# Patient Record
Sex: MALE | Employment: OTHER
[De-identification: names, ages, dates, MRNs, and addresses within clinical notes are randomized per-mention and may not be internally consistent; named-entity substitution may affect disease eponyms.]

---

## 2024-07-11 ENCOUNTER — HOSPITAL ENCOUNTER (EMERGENCY)
Facility: HOSPITAL | Age: 69
Discharge: HOME | End: 2024-07-14
Attending: EMERGENCY MEDICINE
Payer: MEDICARE

## 2024-07-11 DIAGNOSIS — R45.851 SUICIDAL IDEATION: Primary | ICD-10-CM

## 2024-07-11 DIAGNOSIS — F11.93 OPIOID WITHDRAWAL (HCC): ICD-10-CM

## 2024-07-11 LAB
AMPHET UR QL SCN: NEGATIVE
ANION GAP SERPL CALC-SCNC: 6 MMOL/L (ref 3–18)
BARBITURATES UR QL SCN: NEGATIVE
BASOPHILS # BLD: 0.1 K/UL (ref 0–0.1)
BASOPHILS NFR BLD: 0 % (ref 0–2)
BENZODIAZ UR QL: NEGATIVE
BUN SERPL-MCNC: 5 MG/DL (ref 7–18)
BUN/CREAT SERPL: 9 (ref 12–20)
CALCIUM SERPL-MCNC: 8.7 MG/DL (ref 8.5–10.1)
CANNABINOIDS UR QL SCN: NEGATIVE
CHLORIDE SERPL-SCNC: 104 MMOL/L (ref 100–111)
CO2 SERPL-SCNC: 26 MMOL/L (ref 21–32)
COCAINE UR QL SCN: POSITIVE
CREAT SERPL-MCNC: 0.53 MG/DL (ref 0.6–1.3)
DIFFERENTIAL METHOD BLD: ABNORMAL
EOSINOPHIL # BLD: 0 K/UL (ref 0–0.4)
EOSINOPHIL NFR BLD: 0 % (ref 0–5)
ERYTHROCYTE [DISTWIDTH] IN BLOOD BY AUTOMATED COUNT: 15.2 % (ref 11.6–14.5)
ETHANOL SERPL-MCNC: <3 MG/DL (ref 0–3)
GLUCOSE SERPL-MCNC: 96 MG/DL (ref 74–99)
HCT VFR BLD AUTO: 49.1 % (ref 36–48)
HGB BLD-MCNC: 16.4 G/DL (ref 13–16)
IMM GRANULOCYTES # BLD AUTO: 0 K/UL (ref 0–0.04)
IMM GRANULOCYTES NFR BLD AUTO: 0 % (ref 0–0.5)
LYMPHOCYTES # BLD: 1.2 K/UL (ref 0.9–3.6)
LYMPHOCYTES NFR BLD: 10 % (ref 21–52)
Lab: ABNORMAL
MCH RBC QN AUTO: 29.4 PG (ref 24–34)
MCHC RBC AUTO-ENTMCNC: 33.4 G/DL (ref 31–37)
MCV RBC AUTO: 88 FL (ref 78–100)
METHADONE UR QL: NEGATIVE
MONOCYTES # BLD: 0.5 K/UL (ref 0.05–1.2)
MONOCYTES NFR BLD: 4 % (ref 3–10)
NEUTS SEG # BLD: 10 K/UL (ref 1.8–8)
NEUTS SEG NFR BLD: 85 % (ref 40–73)
NRBC # BLD: 0 K/UL (ref 0–0.01)
NRBC BLD-RTO: 0 PER 100 WBC
OPIATES UR QL: POSITIVE
PCP UR QL: NEGATIVE
PLATELET # BLD AUTO: 228 K/UL (ref 135–420)
PMV BLD AUTO: 9.2 FL (ref 9.2–11.8)
POTASSIUM SERPL-SCNC: 5.7 MMOL/L (ref 3.5–5.5)
RBC # BLD AUTO: 5.58 M/UL (ref 4.35–5.65)
SODIUM SERPL-SCNC: 136 MMOL/L (ref 136–145)
WBC # BLD AUTO: 11.7 K/UL (ref 4.6–13.2)

## 2024-07-11 PROCEDURE — 82077 ASSAY SPEC XCP UR&BREATH IA: CPT

## 2024-07-11 PROCEDURE — 80307 DRUG TEST PRSMV CHEM ANLYZR: CPT

## 2024-07-11 PROCEDURE — 85025 COMPLETE CBC W/AUTO DIFF WBC: CPT

## 2024-07-11 PROCEDURE — 99285 EMERGENCY DEPT VISIT HI MDM: CPT

## 2024-07-11 PROCEDURE — 90792 PSYCH DIAG EVAL W/MED SRVCS: CPT | Performed by: NURSE PRACTITIONER

## 2024-07-11 PROCEDURE — 80048 BASIC METABOLIC PNL TOTAL CA: CPT

## 2024-07-11 PROCEDURE — 6360000002 HC RX W HCPCS: Performed by: PHYSICIAN ASSISTANT

## 2024-07-11 RX ORDER — BUPRENORPHINE HYDROCHLORIDE AND NALOXONE HYDROCHLORIDE DIHYDRATE 2; .5 MG/1; MG/1
2 TABLET SUBLINGUAL PRN
Status: DISCONTINUED | OUTPATIENT
Start: 2024-07-11 | End: 2024-07-14 | Stop reason: HOSPADM

## 2024-07-11 RX ADMIN — BUPRENORPHINE HYDROCHLORIDE AND NALOXONE HYDROCHLORIDE DIHYDRATE 2 TABLET: 2; .5 TABLET SUBLINGUAL at 21:12

## 2024-07-11 NOTE — ED NOTES
Contact information of family:    Mario (daughter) [phone]  Godfrey (nephew) [phone]  Ronaldo (brother) [phone]

## 2024-07-11 NOTE — ED TRIAGE NOTES
Patient arrived via [facility].  Patient was found wandering.  States he was in detox this am and left because it was too cold. Patient states he now feels suicidal, will hang himself as he has tried this in the past and feels this will be \"an easy way to go\".  Patient changed into paper scrubs and red non slip socks. Urine sample obtained and sent to the lab.

## 2024-07-11 NOTE — ED NOTES
Spoke with telepsych provider.  Advised provider of SI statement from patient with a plan to \"hang myself\". Provided contact number of family.

## 2024-07-12 LAB
ANION GAP SERPL CALC-SCNC: 9 MMOL/L (ref 3–18)
BUN SERPL-MCNC: 5 MG/DL (ref 7–18)
BUN/CREAT SERPL: 10 (ref 12–20)
CALCIUM SERPL-MCNC: 7.4 MG/DL (ref 8.5–10.1)
CHLORIDE SERPL-SCNC: 103 MMOL/L (ref 100–111)
CO2 SERPL-SCNC: 25 MMOL/L (ref 21–32)
CREAT SERPL-MCNC: 0.51 MG/DL (ref 0.6–1.3)
GLUCOSE SERPL-MCNC: 114 MG/DL (ref 74–99)
POTASSIUM SERPL-SCNC: 2.7 MMOL/L (ref 3.5–5.5)
SODIUM SERPL-SCNC: 137 MMOL/L (ref 136–145)

## 2024-07-12 PROCEDURE — 80048 BASIC METABOLIC PNL TOTAL CA: CPT

## 2024-07-12 PROCEDURE — 6360000002 HC RX W HCPCS: Performed by: STUDENT IN AN ORGANIZED HEALTH CARE EDUCATION/TRAINING PROGRAM

## 2024-07-12 PROCEDURE — 96361 HYDRATE IV INFUSION ADD-ON: CPT

## 2024-07-12 PROCEDURE — 96374 THER/PROPH/DIAG INJ IV PUSH: CPT

## 2024-07-12 PROCEDURE — 6370000000 HC RX 637 (ALT 250 FOR IP): Performed by: EMERGENCY MEDICINE

## 2024-07-12 PROCEDURE — 6370000000 HC RX 637 (ALT 250 FOR IP): Performed by: HEALTH CARE PROVIDER

## 2024-07-12 PROCEDURE — 6360000002 HC RX W HCPCS: Performed by: PHYSICIAN ASSISTANT

## 2024-07-12 PROCEDURE — 6370000000 HC RX 637 (ALT 250 FOR IP): Performed by: STUDENT IN AN ORGANIZED HEALTH CARE EDUCATION/TRAINING PROGRAM

## 2024-07-12 PROCEDURE — 6360000002 HC RX W HCPCS: Performed by: HEALTH CARE PROVIDER

## 2024-07-12 PROCEDURE — 2580000003 HC RX 258: Performed by: HEALTH CARE PROVIDER

## 2024-07-12 PROCEDURE — 96376 TX/PRO/DX INJ SAME DRUG ADON: CPT

## 2024-07-12 RX ORDER — SODIUM CHLORIDE, SODIUM LACTATE, POTASSIUM CHLORIDE, AND CALCIUM CHLORIDE .6; .31; .03; .02 G/100ML; G/100ML; G/100ML; G/100ML
2000 INJECTION, SOLUTION INTRAVENOUS ONCE
Status: COMPLETED | OUTPATIENT
Start: 2024-07-12 | End: 2024-07-12

## 2024-07-12 RX ORDER — LOSARTAN POTASSIUM 25 MG/1
25 TABLET ORAL DAILY
Status: DISCONTINUED | OUTPATIENT
Start: 2024-07-12 | End: 2024-07-14 | Stop reason: HOSPADM

## 2024-07-12 RX ORDER — POTASSIUM CHLORIDE 20 MEQ/1
40 TABLET, EXTENDED RELEASE ORAL
Status: COMPLETED | OUTPATIENT
Start: 2024-07-12 | End: 2024-07-12

## 2024-07-12 RX ORDER — POTASSIUM CHLORIDE 20 MEQ/1
40 TABLET, EXTENDED RELEASE ORAL
Status: DISCONTINUED | OUTPATIENT
Start: 2024-07-12 | End: 2024-07-12

## 2024-07-12 RX ORDER — ONDANSETRON 4 MG/1
4 TABLET, ORALLY DISINTEGRATING ORAL
Status: COMPLETED | OUTPATIENT
Start: 2024-07-12 | End: 2024-07-12

## 2024-07-12 RX ORDER — ONDANSETRON 2 MG/ML
4 INJECTION INTRAMUSCULAR; INTRAVENOUS ONCE
Status: COMPLETED | OUTPATIENT
Start: 2024-07-12 | End: 2024-07-12

## 2024-07-12 RX ORDER — DIPHENHYDRAMINE HCL 25 MG
50 CAPSULE ORAL
Status: COMPLETED | OUTPATIENT
Start: 2024-07-12 | End: 2024-07-12

## 2024-07-12 RX ORDER — SODIUM CHLORIDE, SODIUM LACTATE, POTASSIUM CHLORIDE, AND CALCIUM CHLORIDE .6; .31; .03; .02 G/100ML; G/100ML; G/100ML; G/100ML
1000 INJECTION, SOLUTION INTRAVENOUS ONCE
Status: DISCONTINUED | OUTPATIENT
Start: 2024-07-12 | End: 2024-07-12

## 2024-07-12 RX ORDER — LANOLIN ALCOHOL/MO/W.PET/CERES
400 CREAM (GRAM) TOPICAL ONCE
Status: DISCONTINUED | OUTPATIENT
Start: 2024-07-12 | End: 2024-07-12

## 2024-07-12 RX ORDER — LANOLIN ALCOHOL/MO/W.PET/CERES
400 CREAM (GRAM) TOPICAL DAILY
Status: DISCONTINUED | OUTPATIENT
Start: 2024-07-12 | End: 2024-07-14 | Stop reason: HOSPADM

## 2024-07-12 RX ORDER — ONDANSETRON 2 MG/ML
4 INJECTION INTRAMUSCULAR; INTRAVENOUS EVERY 6 HOURS PRN
Status: DISCONTINUED | OUTPATIENT
Start: 2024-07-12 | End: 2024-07-14 | Stop reason: HOSPADM

## 2024-07-12 RX ADMIN — LOSARTAN POTASSIUM 25 MG: 25 TABLET, FILM COATED ORAL at 15:10

## 2024-07-12 RX ADMIN — SODIUM CHLORIDE, POTASSIUM CHLORIDE, SODIUM LACTATE AND CALCIUM CHLORIDE 2000 ML: 600; 310; 30; 20 INJECTION, SOLUTION INTRAVENOUS at 05:38

## 2024-07-12 RX ADMIN — POTASSIUM CHLORIDE 40 MEQ: 1500 TABLET, EXTENDED RELEASE ORAL at 17:44

## 2024-07-12 RX ADMIN — ONDANSETRON 4 MG: 2 INJECTION INTRAMUSCULAR; INTRAVENOUS at 15:37

## 2024-07-12 RX ADMIN — ONDANSETRON 4 MG: 2 INJECTION INTRAMUSCULAR; INTRAVENOUS at 22:08

## 2024-07-12 RX ADMIN — BUPRENORPHINE HYDROCHLORIDE AND NALOXONE HYDROCHLORIDE DIHYDRATE 2 TABLET: 2; .5 TABLET SUBLINGUAL at 01:20

## 2024-07-12 RX ADMIN — ONDANSETRON 4 MG: 2 INJECTION INTRAMUSCULAR; INTRAVENOUS at 15:36

## 2024-07-12 RX ADMIN — ONDANSETRON 4 MG: 4 TABLET, ORALLY DISINTEGRATING ORAL at 00:35

## 2024-07-12 RX ADMIN — DIPHENHYDRAMINE HYDROCHLORIDE 50 MG: 25 CAPSULE ORAL at 22:30

## 2024-07-12 RX ADMIN — BUPRENORPHINE HYDROCHLORIDE AND NALOXONE HYDROCHLORIDE DIHYDRATE 2 TABLET: 2; .5 TABLET SUBLINGUAL at 09:51

## 2024-07-12 RX ADMIN — Medication 400 MG: at 17:45

## 2024-07-12 RX ADMIN — BUPRENORPHINE HYDROCHLORIDE AND NALOXONE HYDROCHLORIDE DIHYDRATE 2 TABLET: 2; .5 TABLET SUBLINGUAL at 15:36

## 2024-07-12 RX ADMIN — ONDANSETRON 4 MG: 2 INJECTION INTRAMUSCULAR; INTRAVENOUS at 08:13

## 2024-07-12 ASSESSMENT — PAIN - FUNCTIONAL ASSESSMENT: PAIN_FUNCTIONAL_ASSESSMENT: NONE - DENIES PAIN

## 2024-07-12 NOTE — BSMART NOTE
Chief Complaint   Patient presents with    Suicidal       Patient was evaluated by Tele-Psych who recommends inpatient psychiatric treatment for suicidal ideations with a plan to hang himself with a belt.    Patient is voluntary for inpatient treatment.    No past medical history on file.    Prior to Visit Medications    Not on File         The following labs and vitals were reviewed with on-call psychiatrist.    BMP, CBC, ETOH, and UDS    Vitals:    07/12/24 0035   BP: (!) 165/105   Pulse: 66   Resp: 22   Temp:    SpO2: 98%         Writer met with patient to assess the following    Ambulation - Patient reports ability to ambulate without difficulty or the use of any assistive devices. and Patient reports a history of falls. Last fall \"last week.\"    ADLs - Patient able to perform own ADLs without assistance.    DME - Patient requires none.    In consultation with on call [facility] provider JAMES Zimmerman NP  patient has been  recommended for a bed search due to patient declining admission to [facility].  Patient requesting to go to facility that administers Suboxone. Patient will be referred to [facility] to initiate a bed search.

## 2024-07-12 NOTE — BSMART NOTE
Crisis Note: Bed search in progress:    Pending:  [facility]  [facility]     Capacity:  BS RISA  [facility]     No Jaycee:  Crisis Stabilizatio

## 2024-07-12 NOTE — ED NOTES
Bedside and Verbal shift change report given to Whitley(oncoming nurse) by Ana Maria (offgoing nurse). Report included the following information Nurse Handoff Report, ED SBAR, Intake/Output, and Recent Results.

## 2024-07-12 NOTE — BSMART NOTE
Crisis note:    Fernanda ghotra [facility] called to inform patient is being denied at facilities due to his elevated blood pressure, high potassium level, and frequent vomiting.    Called Dr. Hartman in the ER to discuss the above and he will address these concerns.

## 2024-07-12 NOTE — ED NOTES
I received the patient in turnover from HARVEY Suarez at the end of his shift.  The patient is a 69-year-old male who presented to the ED with suicidal ideation and opioid withdrawal.  He is on day 1 of outpatient Suboxone therapy.  He has been evaluated by telepsych who recommends admission.  Crisis is doing a bed search at this time.     Viviana Washington MD  07/15/24 6269

## 2024-07-12 NOTE — BSMART NOTE
Crisis note:    Called conduit and spoke to Winter, questioned if referral was sent to [facility]. Winter did not see where the referral was sent to [facility] but she will send now.

## 2024-07-13 PROCEDURE — 96376 TX/PRO/DX INJ SAME DRUG ADON: CPT

## 2024-07-13 PROCEDURE — 90832 PSYTX W PT 30 MINUTES: CPT | Performed by: SOCIAL WORKER

## 2024-07-13 PROCEDURE — 6360000002 HC RX W HCPCS: Performed by: NURSE PRACTITIONER

## 2024-07-13 PROCEDURE — 6360000002 HC RX W HCPCS: Performed by: PHYSICIAN ASSISTANT

## 2024-07-13 PROCEDURE — 6370000000 HC RX 637 (ALT 250 FOR IP): Performed by: STUDENT IN AN ORGANIZED HEALTH CARE EDUCATION/TRAINING PROGRAM

## 2024-07-13 RX ORDER — ONDANSETRON 2 MG/ML
4 INJECTION INTRAMUSCULAR; INTRAVENOUS ONCE
Status: COMPLETED | OUTPATIENT
Start: 2024-07-13 | End: 2024-07-13

## 2024-07-13 RX ADMIN — LOSARTAN POTASSIUM 25 MG: 25 TABLET, FILM COATED ORAL at 08:47

## 2024-07-13 RX ADMIN — BUPRENORPHINE HYDROCHLORIDE AND NALOXONE HYDROCHLORIDE DIHYDRATE 2 TABLET: 2; .5 TABLET SUBLINGUAL at 08:54

## 2024-07-13 RX ADMIN — ONDANSETRON 4 MG: 2 INJECTION INTRAMUSCULAR; INTRAVENOUS at 08:47

## 2024-07-13 RX ADMIN — Medication 400 MG: at 08:47

## 2024-07-13 RX ADMIN — BUPRENORPHINE HYDROCHLORIDE AND NALOXONE HYDROCHLORIDE DIHYDRATE 2 TABLET: 2; .5 TABLET SUBLINGUAL at 12:38

## 2024-07-13 NOTE — ED NOTES
Patient c/o trouble sleeping, MD notified, orders for benadryl placed. Patient took med in apple sauce

## 2024-07-13 NOTE — ED NOTES
Bedside and Verbal shift change report given to Natalie (oncoming nurse) by Whitley (offgoing nurse). Report included the following information Nurse Handoff Report, ED SBAR, and MAR.

## 2024-07-13 NOTE — BSMART NOTE
Crisis Note:   1340   Call to [facility]   [No callback number listed]   Entered By: Yesy Will, RN   Pending:  [facility]  [facility]     Capacity (overnight)  [facility]  [facility]  [facility]     Declined:   BS TC would review again is medically stable   [facility]  [facility] psych- if BP under contro will review again  [facility]- [facility]   [facility]

## 2024-07-13 NOTE — BSMART NOTE
CRISIS NOTE: Spoke with Annalise Chen for an update on bed search.  He advised the bed search has been exhausted for tonight and they will re-start in the morning.           Lizzie Chandler, CAROLE, CSAC, CADC  BSMART Counselor

## 2024-07-14 VITALS
DIASTOLIC BLOOD PRESSURE: 77 MMHG | OXYGEN SATURATION: 98 % | WEIGHT: 116 LBS | HEART RATE: 91 BPM | HEIGHT: 67 IN | SYSTOLIC BLOOD PRESSURE: 114 MMHG | RESPIRATION RATE: 20 BRPM | TEMPERATURE: 98.2 F | BODY MASS INDEX: 18.21 KG/M2

## 2024-07-14 PROCEDURE — 6360000002 HC RX W HCPCS: Performed by: HEALTH CARE PROVIDER

## 2024-07-14 PROCEDURE — 96376 TX/PRO/DX INJ SAME DRUG ADON: CPT

## 2024-07-14 PROCEDURE — 90832 PSYTX W PT 30 MINUTES: CPT | Performed by: SOCIAL WORKER

## 2024-07-14 PROCEDURE — 6370000000 HC RX 637 (ALT 250 FOR IP): Performed by: STUDENT IN AN ORGANIZED HEALTH CARE EDUCATION/TRAINING PROGRAM

## 2024-07-14 PROCEDURE — 6360000002 HC RX W HCPCS: Performed by: PHYSICIAN ASSISTANT

## 2024-07-14 RX ORDER — POTASSIUM CHLORIDE 20 MEQ/1
40 TABLET, EXTENDED RELEASE ORAL DAILY
Qty: 20 TABLET | Refills: 0 | Status: SHIPPED | OUTPATIENT
Start: 2024-07-14 | End: 2024-07-24

## 2024-07-14 RX ADMIN — BUPRENORPHINE HYDROCHLORIDE AND NALOXONE HYDROCHLORIDE DIHYDRATE 2 TABLET: 2; .5 TABLET SUBLINGUAL at 13:10

## 2024-07-14 RX ADMIN — Medication 400 MG: at 09:14

## 2024-07-14 RX ADMIN — ONDANSETRON 4 MG: 2 INJECTION INTRAMUSCULAR; INTRAVENOUS at 09:14

## 2024-07-14 RX ADMIN — LOSARTAN POTASSIUM 25 MG: 25 TABLET, FILM COATED ORAL at 09:14

## 2024-07-14 ASSESSMENT — PAIN SCALES - GENERAL: PAINLEVEL_OUTOF10: 0

## 2024-07-14 NOTE — ED NOTES
Assumed care of patient. Patient laying on left side. Quiet, calm. Eyes closed, wearing glasses, wearing blue scrubs, no socks. No respiratory distress observed.

## 2024-07-14 NOTE — ED NOTES
Bedside shift change report given to RN April by RN Ed. Report included the following information Nurse Handoff Report, MAR, and Recent Results.

## 2024-07-14 NOTE — ED NOTES
Assumed care of this patient. Patient is conscious and coherent, not in cardiopulmonary distress.   Patient denies SI/HI; denies Auditory/Visual hallucinations as well.

## 2024-07-14 NOTE — BSMART NOTE
Crisis Note:    0612   Call to [facility]   [No callback number listed]   Entered By: Tripp De La Cruz RN   Pending:  [facility]  [facility]     Capacity   [facility]     Declined:   [facility] TC - medical acuity  [facility]  VB psych- medical acuity  [facility]- [facility]   [facility]  [facility]

## 2024-07-14 NOTE — ED PROVIDER NOTES
12:59 PM :Pt care assumed from Dr. Calderon , ED provider. Pt complaint(s), current treatment plan, progression and available diagnostic results have been discussed thoroughly. The patient was seen and evaluated on my shift.   Rounding occurred: Yes  Intended Disposition: TBD  Pending diagnostic reports and/or labs (please list): Placement    Patient was reevaluated this morning by telepsychiatry and the patient is nonsuicidal and based on their reevaluation patient is cleared for discharge.  The patient was given potassium during his stay and will discharge him home with potassium to continue his replacement as an outpatient.  Patient will return if at all worsened or concerned.    Workup and recommendations were reviewed with the patient and all questions were answered.  The patient understands the plan and will proceed with close outpatient care.  I have encouraged the patient to return if at all worsened or concerned.   Phan Deshpande, DO 1:00 PM          Phan Deshpande MD  07/20/24 7756    
2327   Pt care received from Izabela Vallecillo PA-C, ED provider. Pt complaint(s), current treatment plan, progression and available diagnostic results have been discussed thoroughly. The patient was seen and evaluated on my shift, but please refer to their note for history and physical exam. Patient was informed of changing providers.   Rounding occurred:   Intended Disposition: inpatient psychiatry  Pending diagnostic reports and/or labs (please list): bed search    Chief complaint  Suicidal      ED Course as of 07/12/24 0501   Thu Jul 11, 2024   1653 Spoke with telepsych who recommends inpatient behavioral health admission.  [ET]   2327 Bed search in progress  Patient has SI with plan to hang himself today, doing outpatient heroin detox treated with Suboxone here prn.  Patient is medically cleared for inpatient substance abuse rehab. [AS]   2358 Patient vomiting in emesis bag [AS]   Fri Jul 12, 2024   0238 Patient resting in bed, not asleep but eyes closed and shifting from time to time. Last suboxone @ 0120 [AS]   0400 Patient awakens and has episode of vomiting, ordered 4 mg Zofran as needed for vomiting, 2 L lactated Ringer IV fluid bolus. [AS]      ED Course User Index  [AS] Adolph Suarez PA-C  [ET] Izabela Vallecillo PA       Diagnosis  1. Suicidal ideation    2. Opioid withdrawal (HCC)      0500  Pt care transferred to Dr. Washington ED provider. Pt complaint(s), current treatment plan, progression and available diagnostic results have been discussed thoroughly. The patient was seen and evaluated on my shift, but please refer to their note for assessment and disposition. Patient was informed of changing providers.   Rounding occurred: at provider station  Intended Disposition: Inpatient psych  Pending diagnostic reports and/or labs (please list): COWS assessments, bed search in progress      DISPOSITION        Orders Placed This Encounter   Medications    buprenorphine-naloxone (SUBOXONE) 2-0.5 MG SL tablet 2 
  Cardiovascular:  Negative for chest pain.   Gastrointestinal:  Negative for abdominal pain, diarrhea and vomiting.   Genitourinary:  Negative for dysuria and penile discharge.   Musculoskeletal:  Negative for arthralgias and myalgias.   Skin:  Negative for color change and wound.   Neurological:  Negative for dizziness and light-headedness.   Psychiatric/Behavioral:  Positive for suicidal ideas.    All other systems reviewed and are negative.      Physical Exam     Vitals:    07/12/24 0845 07/12/24 1115 07/12/24 1443 07/12/24 1719   BP: (!) 160/80 (!) 178/80 (!) 170/104 (!) 170/99   Pulse: 62 72 70 84   Resp: 20 18 20 17   Temp: 98 °F (36.7 °C) 98 °F (36.7 °C) 98.2 °F (36.8 °C) 98.2 °F (36.8 °C)   TempSrc: Oral Oral Oral Oral   SpO2: 99% 99% 99% 99%   Weight:       Height:           Physical Exam  Vitals and nursing note reviewed.   Constitutional:       Appearance: Normal appearance.      Comments: Pt in NAD   HENT:      Head: Normocephalic and atraumatic.   Eyes:      Conjunctiva/sclera: Conjunctivae normal.   Cardiovascular:      Rate and Rhythm: Normal rate and regular rhythm.   Pulmonary:      Effort: Pulmonary effort is normal.      Breath sounds: Normal breath sounds.   Abdominal:      General: Bowel sounds are normal.      Palpations: Abdomen is soft.   Musculoskeletal:      Cervical back: Full passive range of motion without pain.   Skin:     General: Skin is warm and moist.   Neurological:      Mental Status: He is alert.   Psychiatric:         Behavior: Behavior is withdrawn.         Thought Content: Thought content includes suicidal ideation. Thought content does not include homicidal ideation. Thought content includes suicidal plan.         Diagnostic Study Results     Labs -     Recent Results (from the past 12 hour(s))   BMP    Collection Time: 07/12/24  3:03 PM   Result Value Ref Range    Sodium 137 136 - 145 mmol/L    Potassium 2.7 (LL) 3.5 - 5.5 mmol/L    Chloride 103 100 - 111 mmol/L    CO2 25

## 2024-07-14 NOTE — VIRTUAL HEALTH
Pt remains nauseated - not complaining at the time - has been given medications.    This writer spoke with Ana Maria JENNINGS , ED will continue to seek placement for patient no re- assessment needs at this time.        Urbano Robles, was evaluated through a synchronous (real-time) audio-video encounter. The patient (and/or guardian if applicable) is aware that this is a billable service, which includes applicable co-pays. This virtual visit was conducted with patient's (and/or legal guardian's) consent. Patient identification was verified, and a caregiver was present when appropriate.  The patient was located at Facility (Appt Department): [facility] EMERGENCY DEPT  [address] [address]  Loc: [phone]  The provider was located at Home (City/State): [place], [place]   Confirm you are appropriately licensed, registered, or certified to deliver care in the state where the patient is located as indicated above. If you are not or unsure, please re-schedule the visit: Yes, I confirm.   Consults     Total time spent on this encounter: Not billed by time    --Lety Pedraza LCSW on 7/12/2024 at 5:58 PM    An electronic signature was used to authenticate this note.    
Telepsych  Crisis 24-Hour Reassessment       C-SSRS Screening Completed:     Current Suicide Risk (Low, Moderate, High):  low no SI reported for the last few days.    Mental Status Exam:     Sensorium  oriented to time, place and person   Orientation person, place, time/date, and situation   Relations cooperative   Eye Contact appropriate   Appearance:  age appropriate   Motor Behavior:  within normal limits   Speech:  normal pitch and normal volume   Vocabulary average   Thought Process: within normal limits   Thought Content no hallucinations and no delusions   Suicidal ideations no plan  and no intention   Homicidal ideations no plan  and no intention   Mood:  within normal limits   Affect:  normal   Memory recent  adequate   Memory remote:  adequate   Concentration:  adequate   Abstraction:  abstract   Insight:  fair   Reliability fair   Judgment:  age appropriate       Updated Collateral: Patient did not have any collateral for the writer at this time.     Brief Summary: Writer talked to the patient, patient reported that he is not suicidal, and is not homicidal. Patient reported that he wants to stay in the hospital because his stomach hurts and does not want to go home. Patient reported that he is in pain.    Updated Level of Care/Disposition:  Per ED notes patient has continued to deny SI/HI; denies Auditory/Visual hallucinations as well. Due to the patient having consecutive days of denying Sucidal ideation, and homicidal ideation. Writer is recommending discharge for the patient  at this time.     Safety Plan (Developed/Reviewed):Miguel Robles, was evaluated through a synchronous (real-time) audio-video encounter. The patient (and/or guardian if applicable) is aware that this is a billable service, which includes applicable co-pays. This virtual visit was conducted with patient's (and/or legal guardian's) consent. Patient identification was verified, and a caregiver was present when 
provider was located at Home (City/State): [place]   Confirm you are appropriately licensed, registered, or certified to deliver care in the state where the patient is located as indicated above. If you are not or unsure, please re-schedule the visit: Yes, I confirm.   Consults     Total time spent on this encounter: Not billed by time    --DEVAN Suárez on 7/13/2024 at 4:35 PM    An electronic signature was used to authenticate this note.    
self.  Maintain sitter and suicide precautions.  Medical co-morbidities: Management per medical providers, appreciate assistance.  Medication Recommendations:  Recommend COWS monitoring and PRN medication medication per facility protocol.   Reviewed treatment plan with patient. Patient verbalized understanding.  Patient had an opportunity to ask questions and address concerns. Obtained informed consent for treatment.  Medical records, labs, and diagnostic tests reviewed.   Re-consult for any new changes or concerns. Thank you for this consult.  Discussed recommendations with HARVEY Tamez at time of consult completion.    TelePsych recommendations:Inpatient psychiatric admission    Legal hold: No Involuntary Hold    Telepsychiatry will sign off. Thank you for allowing us to participate in the care of this patient. Please send message or call via [employer] if anything more is required.     Electronically signed by GOGO Deleon CNP on 7/11/2024 at 4:08 PM.    END OF NOTE  -------------------------

## 2024-07-14 NOTE — BSMART NOTE
Crisis Note: Bed search in progress:    Pending:  [facility]  [facility]     Capacity   [facility]  [facility]     Declined:   [facility] - medical acuity  [facility]  [facility] psych- medical acuity  [facility]   [facility]  [facility]

## 2024-07-14 NOTE — ED NOTES
Patient laying left side, eyes closed, quiet, calm, wearing blue scrubs, no socks. Blankets off to the side of the bed. No respiratory distress observed.  Sitter has patient within line of sight.